# Patient Record
Sex: FEMALE | URBAN - METROPOLITAN AREA
[De-identification: names, ages, dates, MRNs, and addresses within clinical notes are randomized per-mention and may not be internally consistent; named-entity substitution may affect disease eponyms.]

---

## 2020-09-19 ENCOUNTER — TRANSFERRED RECORDS (OUTPATIENT)
Dept: HEALTH INFORMATION MANAGEMENT | Facility: CLINIC | Age: 85
End: 2020-09-19

## 2020-10-12 ENCOUNTER — TRANSFERRED RECORDS (OUTPATIENT)
Dept: HEALTH INFORMATION MANAGEMENT | Facility: CLINIC | Age: 85
End: 2020-10-12

## 2020-10-13 ENCOUNTER — TRANSFERRED RECORDS (OUTPATIENT)
Dept: HEALTH INFORMATION MANAGEMENT | Facility: CLINIC | Age: 85
End: 2020-10-13

## 2020-10-13 ENCOUNTER — MEDICAL CORRESPONDENCE (OUTPATIENT)
Dept: HEALTH INFORMATION MANAGEMENT | Facility: CLINIC | Age: 85
End: 2020-10-13

## 2020-10-14 ENCOUNTER — TRANSCRIBE ORDERS (OUTPATIENT)
Dept: OTHER | Age: 85
End: 2020-10-14

## 2020-10-14 ENCOUNTER — TRANSFERRED RECORDS (OUTPATIENT)
Dept: HEALTH INFORMATION MANAGEMENT | Facility: CLINIC | Age: 85
End: 2020-10-14

## 2020-10-14 DIAGNOSIS — M25.561 POSTERIOR RIGHT KNEE PAIN: Primary | ICD-10-CM

## 2020-11-04 NOTE — TELEPHONE ENCOUNTER
RECORDS RECEIVED FROM: [phone] - nimesh jay, pt has demensia.  Posterior Right Knee Pain from Dr. Berlin Butler MD from [facility]  MRI done 9/29/20 and xrays done 9/19/20 at [facility] in [place]   DATE RECEIVED: Nov 10, 2020     NOTES STATUS DETAILS   OFFICE NOTE from referring provider Care Everywhere    OFFICE NOTE from other specialist N/A    DISCHARGE SUMMARY from hospital N/A    DISCHARGE REPORT from the ER N/A    OPERATIVE REPORT N/A    MEDICATION LIST Internal    IMPLANT RECORD/STICKER N/A    LABS     CBC/DIFF N/A    CULTURES N/A    INJECTIONS DONE IN RADIOLOGY N/A    MRI In process    CT SCAN N/A    XRAYS (IMAGES & REPORTS) In process    TUMOR     PATHOLOGY  Slides & report N/A    11/05/20   4:38 PM   Images in PACS  Complete  Ahsleigh Nolasco CMA    11/04/20   9:21 AM   Called [facility] and asked for images to be pushed  Ashleigh Nolasco CMA

## 2020-11-10 ENCOUNTER — PRE VISIT (OUTPATIENT)
Dept: ORTHOPEDICS | Facility: CLINIC | Age: 85
End: 2020-11-10

## 2020-11-10 ENCOUNTER — VIRTUAL VISIT (OUTPATIENT)
Dept: ORTHOPEDICS | Facility: CLINIC | Age: 85
End: 2020-11-10
Payer: COMMERCIAL

## 2020-11-10 DIAGNOSIS — D36.13 SCHWANNOMA OF NERVE OF LOWER EXTREMITY: Primary | ICD-10-CM

## 2020-11-10 PROCEDURE — 99202 OFFICE O/P NEW SF 15 MIN: CPT | Mod: 95 | Performed by: ORTHOPAEDIC SURGERY

## 2020-11-10 RX ORDER — ATENOLOL 25 MG/1
TABLET ORAL
COMMUNITY
Start: 2020-07-29

## 2020-11-10 RX ORDER — AMOXICILLIN 250 MG
CAPSULE ORAL
COMMUNITY
Start: 2020-09-07

## 2020-11-10 RX ORDER — ALBUTEROL SULFATE 1.25 MG/3ML
1.25 SOLUTION RESPIRATORY (INHALATION) EVERY 6 HOURS PRN
COMMUNITY

## 2020-11-10 RX ORDER — NYSTATIN 100000 [USP'U]/G
POWDER TOPICAL
COMMUNITY
Start: 2020-03-02

## 2020-11-10 RX ORDER — CITALOPRAM HYDROBROMIDE 10 MG/1
TABLET ORAL
COMMUNITY
Start: 2020-09-09

## 2020-11-10 RX ORDER — ALENDRONATE SODIUM 70 MG/1
TABLET ORAL
COMMUNITY
Start: 2020-11-06

## 2020-11-10 RX ORDER — FAMOTIDINE 40 MG/1
40 TABLET, FILM COATED ORAL
COMMUNITY
Start: 2020-10-29

## 2020-11-10 RX ORDER — POLYETHYLENE GLYCOL 3350 17 G/17G
1 POWDER, FOR SOLUTION ORAL
COMMUNITY
Start: 2020-10-26

## 2020-11-10 RX ORDER — ACETAMINOPHEN 500 MG
TABLET ORAL
COMMUNITY
Start: 2020-09-28

## 2020-11-10 RX ORDER — MELATONIN 5 MG
1 TABLET,CHEWABLE ORAL
COMMUNITY
Start: 2020-09-17

## 2020-11-10 RX ORDER — ASPIRIN 81 MG/1
TABLET, CHEWABLE ORAL
COMMUNITY
Start: 2020-09-07

## 2020-11-10 RX ORDER — DONEPEZIL HYDROCHLORIDE 10 MG/1
10 TABLET, FILM COATED ORAL
COMMUNITY
Start: 2020-09-30

## 2020-11-10 NOTE — PROGRESS NOTES
I was asked to perform a phone consultation for this patient.  Due to her dementia she is currently not effective at communicating by telephone.  I spoke with her nurse .  And obtain a history through her.    In summary the patient was more active pre-Covid and was having activity related pain in her right knee which the care team thought was slowing her down.  As part of her evaluation of knee pain which is activity related she was seen in the local emergency room and had x-rays and ultimately an MRI scan obtained.    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    Plan: 1.  I reassured her nurse manager that I think at this time the lesion identified an MRI is not causing her symptoms.  2.  I also reassured her that I suspect this is a benign nerve sheath tumor.  3.  I recommended they contact us if the lesion grows 2-3 times in size.  If that was the case we would need to perform most likely a surgical excision.  I do not expect that will be the case.

## 2020-11-10 NOTE — LETTER
11/10/2020         RE: Celine Montejo  [address] [address] [place] [address]        Dear Colleague,    Thank you for referring your patient, Celine Montejo, to the [facility] [place]. Please see a copy of my visit note below.    I was asked to perform a phone consultation for this patient.  Due to her dementia she is currently not effective at communicating by telephone.  I spoke with her nurse .  And obtain a history through her.    In summary the patient was more active pre-Covid and was having activity related pain in her right knee which the care team thought was slowing her down.  As part of her evaluation of knee pain which is activity related she was seen in the local emergency room and had x-rays and ultimately an MRI scan obtained.    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    Plan: 1.  I reassured her nurse manager that I think at this time the lesion identified an MRI is not causing her symptoms.  2.  I also reassured her that I suspect this is a benign nerve sheath tumor.  3.  I recommended they contact us if the lesion grows 2-3 times in size.  If that was the case we would need to perform most likely a surgical excision.  I do not expect that will be the case.          Again, thank you for allowing me to participate in the care of your patient.        Sincerely,        Bryant Troncoso MD

## 2020-11-10 NOTE — NURSING NOTE
Chief Complaint   Patient presents with     Consult     consulting Posterior Right Knee Pain from Dr. Berlin Butler MD from [facility]        87 year old  11/5/1933              Pain Assessment  Patient Currently in Pain: Unable to assess               Data Unavailable    Allergies   Allergen Reactions     Amoxicillin      Dust Mite Extract Cough     Molds & Smuts Cough     Tree Extract      Trees, white birch     Levofloxacin Rash       Current Outpatient Medications   Medication     acetaminophen (TYLENOL) 500 MG tablet     albuterol (ACCUNEB) 1.25 MG/3ML neb solution     alendronate (FOSAMAX) 70 MG tablet     aspirin (ASA) 81 MG chewable tablet     atenolol (TENORMIN) 25 MG tablet     calcium carbonate 600 mg-vitamin D 400 units (CALTRATE) 600-400 MG-UNIT per tablet     cholecalciferol 50 MCG (2000 UT) tablet     citalopram (CELEXA) 10 MG tablet     diclofenac (VOLTAREN) 1 % topical gel     donepezil (ARICEPT) 10 MG tablet     famotidine (PEPCID) 40 MG tablet     Melatonin 5 MG CHEW     nystatin (MYCOSTATIN) 182200 UNIT/GM external powder     polyethylene glycol (MIRALAX) 17 GM/Dose powder     senna-docusate (SENOKOT-S/PERICOLACE) 8.6-50 MG tablet     No current facility-administered medications for this visit.

## 2020-11-10 NOTE — LETTER
Date:November 11, 2020      Patient was self referred, no letter generated. Do not send.        [facility] Information